# Patient Record
Sex: MALE | Race: WHITE | URBAN - METROPOLITAN AREA
[De-identification: names, ages, dates, MRNs, and addresses within clinical notes are randomized per-mention and may not be internally consistent; named-entity substitution may affect disease eponyms.]

---

## 2023-07-24 ENCOUNTER — OFFICE VISIT (OUTPATIENT)
Dept: OTOLARYNGOLOGY | Facility: CLINIC | Age: 33
End: 2023-07-24

## 2023-07-24 DIAGNOSIS — B36.9 FUNGAL OTITIS EXTERNA: Primary | ICD-10-CM

## 2023-07-24 DIAGNOSIS — H62.40 FUNGAL OTITIS EXTERNA: Primary | ICD-10-CM

## 2023-07-24 PROCEDURE — 92504 EAR MICROSCOPY EXAMINATION: CPT | Performed by: OTOLARYNGOLOGY

## 2023-07-24 PROCEDURE — 99203 OFFICE O/P NEW LOW 30 MIN: CPT | Performed by: OTOLARYNGOLOGY

## 2023-07-24 RX ORDER — PITAVASTATIN CALCIUM 1.04 MG/1
1 TABLET, FILM COATED ORAL DAILY
COMMUNITY

## 2023-07-24 RX ORDER — DEXTROAMPHETAMINE/AMPHETAMINE 15 MG
1 CAPSULE, EXT RELEASE 24 HR ORAL DAILY
COMMUNITY
Start: 2023-06-30

## 2023-07-24 NOTE — PROGRESS NOTES
NEW PATIENT PROGRESS NOTE  OTOLOGY/OTOLARYNGOLOGY    REF MD:  Deejay Nickerson  [address] [place] [address]  [address]  [facility],  [address] [place] [address]     PCP: Nino Nolan MD    CHIEF COMPLAINT:  Patient presents with:  Ear Problem: Patient here for right ear infection      HISTORY OF PRESENT ILLNESS: Too Banegas is a 33year old male who presents for evaluation of right ear infection. He went to urgent care 3 days ago and was given augmentin and cortisportin. Has had fungal infection in the ear before. He is flying in 2 days and would like to have his ear checked. Denies hearing loss, vertigo, tinnitus. Denies otorrhea. Right ear itches. PAST MEDICAL HISTORY:    Past Medical History:   Diagnosis Date    Hyperlipidemia        PAST SURGICAL HISTORY:    Past Surgical History:   Procedure Laterality Date    OTHER SURGICAL HISTORY Right 2021    right wrist surgery       ADDERALL XR 15 MG Oral Capsule SR 24 Hr, Take 1 capsule (15 mg total) by mouth daily. , Disp: , Rfl:   Pitavastatin Calcium (LIVALO) 1 MG Oral Tab, Take 1 tablet by mouth daily. , Disp: , Rfl:     No current facility-administered medications on file prior to visit. Allergies:   Charentais Melon (F*    OTHER (SEE COMMENTS)    SOCIAL HISTORY:  Social History    Tobacco Use      Smoking status: Never      Smokeless tobacco: Never    Alcohol use: Yes      Comment: socially on weekends      FAMILY HISTORY: Denies known family history of hearing loss, tinnitus, vertigo, or migraine. Denies known family history of head and neck cancer, thyroid cancer, bleeding disorders.      REVIEW OF SYSTEMS:   Positives are in bold  Neuro: Headache, facial weakness, facial numbness, neck pain, vertigo  ENT: Hearing change, tinnitus, otorrhea, otalgia, aural fullness, ear pressure, vertigo, imbalance  Sinus pressure, rhinorrhea, congestion, facial pain, jaw pain, dysphagia, odynophagia, sore throat, voice changes, shortness of breath    EXAMINATION:  I washed my hands with an alcohol-based hand gel prior to examination  Constitutional:   --Vitals: There were no vitals taken for this visit. --General: no apparent distress, well-developed, conversant  Psych: affect pleasant and appropriate for age, alert and oriented  Neuro: Facial movement normal bilateral  Respiratory: No stridor, stertor or increased work of breathing  ENT:  --Ear: (bilateral ears were examined under binocular microscopy)  Left ear microscopic exam:  Pinna: Normal, no lesions or masses. Mastoid: Nontender on palpation. External auditory canal: Clear, no masses or lesions. Tympanic membrane: Intact, no lesions, normal landmarks. Middle ear: Aerated. Right ear microscopic exam:  Pinna: Normal, no lesions or masses. Mastoid: Nontender on palpation. External auditory canal: Clear with a small amount of fungal elements, no masses or lesions. Gentian violet applied. Tympanic membrane: Intact, no lesions, normal landmarks. Middle ear: Aerated. ASSESSMENT/PLAN:  Ara Andre is a 33year old male with   (B36.9,  H62.40) Fungal otitis externa  (primary encounter diagnosis)     IMPRESSION:  Fungal otitis externa, right ear    PLAN:  -Dry ear precautions   -Gentian violet applied  -Follow-up in 1-2 weeks     Situation reviewed with the patient in detail. Shannan Ribeiro MD  Otology/Otolaryngology  [place] [address] [address]  [address] [place] [address],[address], [place]  Phone [phone]  Fax [phone]